# Patient Record
Sex: FEMALE | Race: WHITE | Employment: FULL TIME | URBAN - METROPOLITAN AREA
[De-identification: names, ages, dates, MRNs, and addresses within clinical notes are randomized per-mention and may not be internally consistent; named-entity substitution may affect disease eponyms.]

---

## 2018-11-30 ENCOUNTER — OFFICE VISIT (OUTPATIENT)
Dept: FAMILY MEDICINE CLINIC | Age: 38
End: 2018-11-30
Payer: MEDICAID

## 2018-11-30 VITALS
TEMPERATURE: 97.9 F | HEART RATE: 74 BPM | WEIGHT: 164 LBS | HEIGHT: 63 IN | RESPIRATION RATE: 16 BRPM | SYSTOLIC BLOOD PRESSURE: 120 MMHG | OXYGEN SATURATION: 97 % | DIASTOLIC BLOOD PRESSURE: 60 MMHG | BODY MASS INDEX: 29.06 KG/M2

## 2018-11-30 DIAGNOSIS — J02.9 SORE THROAT: ICD-10-CM

## 2018-11-30 DIAGNOSIS — H60.392 OTHER INFECTIVE ACUTE OTITIS EXTERNA OF LEFT EAR: Primary | ICD-10-CM

## 2018-11-30 LAB — S PYO AG THROAT QL: NORMAL

## 2018-11-30 PROCEDURE — 99213 OFFICE O/P EST LOW 20 MIN: CPT | Performed by: PHYSICIAN ASSISTANT

## 2018-11-30 PROCEDURE — G8484 FLU IMMUNIZE NO ADMIN: HCPCS | Performed by: PHYSICIAN ASSISTANT

## 2018-11-30 PROCEDURE — 87880 STREP A ASSAY W/OPTIC: CPT | Performed by: PHYSICIAN ASSISTANT

## 2018-11-30 PROCEDURE — G8419 CALC BMI OUT NRM PARAM NOF/U: HCPCS | Performed by: PHYSICIAN ASSISTANT

## 2018-11-30 PROCEDURE — 4004F PT TOBACCO SCREEN RCVD TLK: CPT | Performed by: PHYSICIAN ASSISTANT

## 2018-11-30 PROCEDURE — G8427 DOCREV CUR MEDS BY ELIG CLIN: HCPCS | Performed by: PHYSICIAN ASSISTANT

## 2018-11-30 PROCEDURE — 4130F TOPICAL PREP RX AOE: CPT | Performed by: PHYSICIAN ASSISTANT

## 2021-04-09 ENCOUNTER — APPOINTMENT (OUTPATIENT)
Dept: ULTRASOUND IMAGING | Age: 41
End: 2021-04-09
Payer: MEDICAID

## 2021-04-09 ENCOUNTER — APPOINTMENT (OUTPATIENT)
Dept: CT IMAGING | Age: 41
End: 2021-04-09
Payer: MEDICAID

## 2021-04-09 ENCOUNTER — HOSPITAL ENCOUNTER (EMERGENCY)
Age: 41
Discharge: HOME | End: 2021-04-09
Attending: EMERGENCY MEDICINE
Payer: MEDICAID

## 2021-04-09 VITALS
BODY MASS INDEX: 27.29 KG/M2 | RESPIRATION RATE: 16 BRPM | SYSTOLIC BLOOD PRESSURE: 114 MMHG | OXYGEN SATURATION: 98 % | TEMPERATURE: 97.7 F | WEIGHT: 154 LBS | HEART RATE: 81 BPM | DIASTOLIC BLOOD PRESSURE: 75 MMHG | HEIGHT: 63 IN

## 2021-04-09 DIAGNOSIS — R10.30 LOWER ABDOMINAL PAIN: Primary | ICD-10-CM

## 2021-04-09 LAB
ALBUMIN SERPL-MCNC: 4.4 G/DL (ref 3.5–5.2)
ALBUMIN SERPL-MCNC: 4.4 G/DL (ref 3.5–5.2)
ALP BLD-CCNC: 65 U/L (ref 35–104)
ALP BLD-CCNC: 66 U/L (ref 35–104)
ALT SERPL-CCNC: 13 U/L (ref 0–32)
ALT SERPL-CCNC: 13 U/L (ref 0–32)
ANION GAP SERPL CALCULATED.3IONS-SCNC: 12 MMOL/L (ref 7–16)
AST SERPL-CCNC: 15 U/L (ref 0–31)
AST SERPL-CCNC: 16 U/L (ref 0–31)
BACTERIA: ABNORMAL /HPF
BILIRUB SERPL-MCNC: 0.6 MG/DL (ref 0–1.2)
BILIRUB SERPL-MCNC: 0.6 MG/DL (ref 0–1.2)
BILIRUBIN DIRECT: <0.2 MG/DL (ref 0–0.3)
BILIRUBIN URINE: NEGATIVE
BILIRUBIN, INDIRECT: NORMAL MG/DL (ref 0–1)
BLOOD, URINE: NEGATIVE
BUN BLDV-MCNC: 14 MG/DL (ref 6–20)
CALCIUM SERPL-MCNC: 8.9 MG/DL (ref 8.6–10.2)
CHLORIDE BLD-SCNC: 104 MMOL/L (ref 98–107)
CLARITY: CLEAR
CO2: 23 MMOL/L (ref 22–29)
COLOR: YELLOW
CREAT SERPL-MCNC: 0.7 MG/DL (ref 0.5–1)
GFR AFRICAN AMERICAN: >60
GFR NON-AFRICAN AMERICAN: >60 ML/MIN/1.73
GLUCOSE BLD-MCNC: 83 MG/DL (ref 74–99)
GLUCOSE URINE: NEGATIVE MG/DL
HCG, URINE, POC: NEGATIVE
HCT VFR BLD CALC: 45.2 % (ref 34–48)
HEMOGLOBIN: 14.7 G/DL (ref 11.5–15.5)
KETONES, URINE: 15 MG/DL
LACTIC ACID, SEPSIS: 1.2 MMOL/L (ref 0.5–1.9)
LEUKOCYTE ESTERASE, URINE: NEGATIVE
LIPASE: 31 U/L (ref 13–60)
Lab: NORMAL
MCH RBC QN AUTO: 31.5 PG (ref 26–35)
MCHC RBC AUTO-ENTMCNC: 32.5 % (ref 32–34.5)
MCV RBC AUTO: 96.8 FL (ref 80–99.9)
NEGATIVE QC PASS/FAIL: NORMAL
NITRITE, URINE: NEGATIVE
PDW BLD-RTO: 13.8 FL (ref 11.5–15)
PH UA: 5.5 (ref 5–9)
PLATELET # BLD: 299 E9/L (ref 130–450)
PMV BLD AUTO: 10.7 FL (ref 7–12)
POSITIVE QC PASS/FAIL: NORMAL
POTASSIUM SERPL-SCNC: 4.3 MMOL/L (ref 3.5–5)
PROTEIN UA: NEGATIVE MG/DL
RBC # BLD: 4.67 E12/L (ref 3.5–5.5)
RBC UA: ABNORMAL /HPF (ref 0–2)
SODIUM BLD-SCNC: 139 MMOL/L (ref 132–146)
SPECIFIC GRAVITY UA: >=1.03 (ref 1–1.03)
TOTAL PROTEIN: 7.4 G/DL (ref 6.4–8.3)
TOTAL PROTEIN: 7.6 G/DL (ref 6.4–8.3)
UROBILINOGEN, URINE: 0.2 E.U./DL
WBC # BLD: 11 E9/L (ref 4.5–11.5)
WBC UA: ABNORMAL /HPF (ref 0–5)

## 2021-04-09 PROCEDURE — 83605 ASSAY OF LACTIC ACID: CPT

## 2021-04-09 PROCEDURE — 96375 TX/PRO/DX INJ NEW DRUG ADDON: CPT

## 2021-04-09 PROCEDURE — 36415 COLL VENOUS BLD VENIPUNCTURE: CPT

## 2021-04-09 PROCEDURE — 76856 US EXAM PELVIC COMPLETE: CPT

## 2021-04-09 PROCEDURE — 85027 COMPLETE CBC AUTOMATED: CPT

## 2021-04-09 PROCEDURE — 74177 CT ABD & PELVIS W/CONTRAST: CPT

## 2021-04-09 PROCEDURE — 83690 ASSAY OF LIPASE: CPT

## 2021-04-09 PROCEDURE — 96376 TX/PRO/DX INJ SAME DRUG ADON: CPT

## 2021-04-09 PROCEDURE — 99284 EMERGENCY DEPT VISIT MOD MDM: CPT

## 2021-04-09 PROCEDURE — 96374 THER/PROPH/DIAG INJ IV PUSH: CPT

## 2021-04-09 PROCEDURE — 6360000004 HC RX CONTRAST MEDICATION: Performed by: RADIOLOGY

## 2021-04-09 PROCEDURE — 6360000002 HC RX W HCPCS: Performed by: EMERGENCY MEDICINE

## 2021-04-09 PROCEDURE — 2580000003 HC RX 258: Performed by: RADIOLOGY

## 2021-04-09 PROCEDURE — 81001 URINALYSIS AUTO W/SCOPE: CPT

## 2021-04-09 PROCEDURE — 80053 COMPREHEN METABOLIC PANEL: CPT

## 2021-04-09 PROCEDURE — 80076 HEPATIC FUNCTION PANEL: CPT

## 2021-04-09 RX ORDER — ONDANSETRON 4 MG/1
4 TABLET, ORALLY DISINTEGRATING ORAL 3 TIMES DAILY PRN
Qty: 30 TABLET | Refills: 0 | Status: SHIPPED | OUTPATIENT
Start: 2021-04-09 | End: 2021-04-09 | Stop reason: SDUPTHER

## 2021-04-09 RX ORDER — ONDANSETRON 4 MG/1
4 TABLET, ORALLY DISINTEGRATING ORAL 3 TIMES DAILY PRN
Qty: 30 TABLET | Refills: 0 | Status: SHIPPED | OUTPATIENT
Start: 2021-04-09 | End: 2021-09-09

## 2021-04-09 RX ORDER — ONDANSETRON 2 MG/ML
4 INJECTION INTRAMUSCULAR; INTRAVENOUS ONCE
Status: COMPLETED | OUTPATIENT
Start: 2021-04-09 | End: 2021-04-09

## 2021-04-09 RX ORDER — MORPHINE SULFATE 4 MG/ML
4 INJECTION, SOLUTION INTRAMUSCULAR; INTRAVENOUS ONCE
Status: COMPLETED | OUTPATIENT
Start: 2021-04-09 | End: 2021-04-09

## 2021-04-09 RX ORDER — DICYCLOMINE HYDROCHLORIDE 10 MG/1
10 CAPSULE ORAL
Qty: 40 CAPSULE | Refills: 0 | Status: SHIPPED | OUTPATIENT
Start: 2021-04-09 | End: 2021-09-09

## 2021-04-09 RX ORDER — KETOROLAC TROMETHAMINE 30 MG/ML
30 INJECTION, SOLUTION INTRAMUSCULAR; INTRAVENOUS ONCE
Status: COMPLETED | OUTPATIENT
Start: 2021-04-09 | End: 2021-04-09

## 2021-04-09 RX ORDER — SODIUM CHLORIDE 0.9 % (FLUSH) 0.9 %
10 SYRINGE (ML) INJECTION
Status: COMPLETED | OUTPATIENT
Start: 2021-04-09 | End: 2021-04-09

## 2021-04-09 RX ORDER — DICYCLOMINE HYDROCHLORIDE 10 MG/1
10 CAPSULE ORAL
Qty: 40 CAPSULE | Refills: 0 | Status: SHIPPED | OUTPATIENT
Start: 2021-04-09 | End: 2021-04-09 | Stop reason: SDUPTHER

## 2021-04-09 RX ADMIN — MORPHINE SULFATE 4 MG: 4 INJECTION, SOLUTION INTRAMUSCULAR; INTRAVENOUS at 15:32

## 2021-04-09 RX ADMIN — Medication 10 ML: at 13:08

## 2021-04-09 RX ADMIN — KETOROLAC TROMETHAMINE 30 MG: 30 INJECTION, SOLUTION INTRAMUSCULAR; INTRAVENOUS at 12:06

## 2021-04-09 RX ADMIN — IOPAMIDOL 90 ML: 755 INJECTION, SOLUTION INTRAVENOUS at 13:08

## 2021-04-09 RX ADMIN — MORPHINE SULFATE 4 MG: 4 INJECTION, SOLUTION INTRAMUSCULAR; INTRAVENOUS at 12:09

## 2021-04-09 RX ADMIN — ONDANSETRON 4 MG: 2 INJECTION INTRAMUSCULAR; INTRAVENOUS at 12:07

## 2021-04-09 ASSESSMENT — PAIN SCALES - GENERAL: PAINLEVEL_OUTOF10: 9

## 2021-04-09 NOTE — ED NOTES
Radiology Procedure Waiver   Name: Bobby Marin  : 1980  MRN: [id number]    Date:  21    Time: 11:33 AM EDT    Benefits of immediately proceeding with Radiology exam(s) without pre-testing outweigh the risks or are not indicated as specified below and therefore the following is/are being waived:    [x] Pregnancy test   [x] Patients LMP on-time and regular.   [] Patient had Tubal Ligation or has other Contraception Device. [] Patient  is Menopausal or Premenarcheal.    [] Patient had Full or Partial Hysterectomy. [] Protocol for Iodine allergy    [] MRI Questionnaire     [x] BUN/Creatinine   [x] Patient age w/no hx of renal dysfunction. [] Patient on Dialysis. [] Recent Normal Labs.   Electronically signed by Amy Shane DO on 21 at 11:33 AM EDT          Patient had urine pregnancy today at PCP office which was negative     Amy Shane DO  [phone]

## 2021-04-09 NOTE — ED PROVIDER NOTES
allergies. ---------------------------------------------------PHYSICAL EXAM--------------------------------------    Constitutional/General: Alert and oriented x3  Head: Normocephalic and atraumatic  Eyes: PERRL, EOMI, sclera non icteric  Mouth: Oropharynx clear, handling secretions, no trismus, no asymmetry of the posterior oropharynx or uvular edema  Neck: Supple, full ROM, no stridor, no meningeal signs  Respiratory: Lungs clear to auscultation bilaterally, no wheezes, rales, or rhonchi. Not in respiratory distress  Cardiovascular:  Regular rate. Regular rhythm. No murmurs, no aortic murmurs, no gallops, or rubs. 2+ distal pulses. Equal extremity pulses. Chest: No chest wall tenderness  GI:  Abdomen Soft, diffusely tender, Non distended. No rebound, guarding, or rigidity. No pulsatile masses. Musculoskeletal: Moves all extremities x 4. Warm and well perfused, no clubbing, cyanosis, or edema. Capillary refill <3 seconds  Integument: skin warm and dry. No rashes. Neurologic: GCS 15, no focal deficits, symmetric strength 5/5 in the upper and lower extremities bilaterally  Psychiatric: Normal Affect          -------------------------------------------------- RESULTS -------------------------------------------------  I have personally reviewed all laboratory and imaging results for this patient. Results are listed below.      LABS: (Lab results interpreted by me)  Results for orders placed or performed during the hospital encounter of 04/09/21   CBC   Result Value Ref Range    WBC 11.0 4.5 - 11.5 E9/L    RBC 4.67 3.50 - 5.50 E12/L    Hemoglobin 14.7 11.5 - 15.5 g/dL    Hematocrit 45.2 34.0 - 48.0 %    MCV 96.8 80.0 - 99.9 fL    MCH 31.5 26.0 - 35.0 pg    MCHC 32.5 32.0 - 34.5 %    RDW 13.8 11.5 - 15.0 fL    Platelets 996 421 - 565 E9/L    MPV 10.7 7.0 - 12.0 fL   Comprehensive Metabolic Panel   Result Value Ref Range    Sodium 139 132 - 146 mmol/L    Potassium 4.3 3.5 - 5.0 mmol/L    Chloride 104 98 - 107 mmol/L    CO2 23 22 - 29 mmol/L    Anion Gap 12 7 - 16 mmol/L    Glucose 83 74 - 99 mg/dL    BUN 14 6 - 20 mg/dL    CREATININE 0.7 0.5 - 1.0 mg/dL    GFR Non-African American >60 >=60 mL/min/1.73    GFR African American >60     Calcium 8.9 8.6 - 10.2 mg/dL    Total Protein 7.4 6.4 - 8.3 g/dL    Albumin 4.4 3.5 - 5.2 g/dL    Total Bilirubin 0.6 0.0 - 1.2 mg/dL    Alkaline Phosphatase 65 35 - 104 U/L    ALT 13 0 - 32 U/L    AST 15 0 - 31 U/L   Lactate, Sepsis   Result Value Ref Range    Lactic Acid, Sepsis 1.2 0.5 - 1.9 mmol/L   Lipase   Result Value Ref Range    Lipase 31 13 - 60 U/L   Urinalysis with Microscopic   Result Value Ref Range    Color, UA Yellow Straw/Yellow    Clarity, UA Clear Clear    Glucose, Ur Negative Negative mg/dL    Bilirubin Urine Negative Negative    Ketones, Urine 15 (A) Negative mg/dL    Specific Gravity, UA >=1.030 1.005 - 1.030    Blood, Urine Negative Negative    pH, UA 5.5 5.0 - 9.0    Protein, UA Negative Negative mg/dL    Urobilinogen, Urine 0.2 <2.0 E.U./dL    Nitrite, Urine Negative Negative    Leukocyte Esterase, Urine Negative Negative    WBC, UA NONE 0 - 5 /HPF    RBC, UA NONE 0 - 2 /HPF    Bacteria, UA NONE SEEN None Seen /HPF   Hepatic Function Panel   Result Value Ref Range    Total Protein 7.6 6.4 - 8.3 g/dL    Albumin 4.4 3.5 - 5.2 g/dL    Alkaline Phosphatase 66 35 - 104 U/L    ALT 13 0 - 32 U/L    AST 16 0 - 31 U/L    Total Bilirubin 0.6 0.0 - 1.2 mg/dL    Bilirubin, Direct <0.2 0.0 - 0.3 mg/dL    Bilirubin, Indirect see below 0.0 - 1.0 mg/dL   POC Pregnancy Urine Qual   Result Value Ref Range    HCG, Urine, POC Negative Negative    Lot Number [id number]     Positive QC Pass/Fail Pass     Negative QC Pass/Fail Pass    ,       RADIOLOGY:  Interpreted by Radiologist unless otherwise specified  US PELVIS COMPLETE   Final Result   Unremarkable pelvic ultrasound. Both ovaries not demonstrated. CT ABDOMEN PELVIS W IV CONTRAST Additional Contrast? None   Final Result   1. There is no intra-abdominal abscess, free air or inflammatory process. Specifically, there is no perirectal inflammatory process. 2. Minimal amount of free fluid seen within the right pericolic gutter. This   finding is nonspecific. Given the lack of any gross inflammatory process   this finding may be related to low-grade enteritis. 3. The appendix is seen and there are no findings of appendicitis.                   ------------------------- NURSING NOTES AND VITALS REVIEWED ---------------------------   The nursing notes within the ED encounter and vital signs as below have been reviewed by myself  /75   Pulse 81   Temp 97.7 °F (36.5 °C)   Resp 16   Ht 5' 3\" (1.6 m)   Wt 154 lb (69.9 kg)   LMP 03/26/2021   SpO2 98%   BMI 27.28 kg/m²     Oxygen Saturation Interpretation: Normal    The patients available past medical records and past encounters were reviewed. ------------------------------ ED COURSE/MEDICAL DECISION MAKING----------------------  Medications   morphine sulfate (PF) injection 4 mg (4 mg Intravenous Given 4/9/21 1209)   ketorolac (TORADOL) injection 30 mg (30 mg Intravenous Given 4/9/21 1206)   ondansetron (ZOFRAN) injection 4 mg (4 mg Intravenous Given 4/9/21 1207)   iopamidol (ISOVUE-370) 76 % injection 90 mL (90 mLs Intravenous Given 4/9/21 1308)   sodium chloride flush 0.9 % injection 10 mL (10 mLs Intravenous Given 4/9/21 1308)   morphine sulfate (PF) injection 4 mg (4 mg Intravenous Given 4/9/21 1532)           The cardiac monitor revealed sinus tachycardia with a heart rate in the 110s as interpreted by me. The cardiac monitor was ordered secondary to the patient's chest pain and to monitor for patient for dysrhythmia. CPT [id number]           Medical Decision Making:     I, Dr. Naomi Arredondo, am the primary provider of record    30-year-old female presenting with lower abdominal pain and cramping after bowel movement.   The pain has been persistent and is now more diffuse. She has had no urinary symptoms to suggest UTI or kidney stone. The pain is rather diffuse and bilateral, lower suspicion for pelvic abnormality such as ovarian torsion or cyst.  More concerned with GI pathology such as acute appendicitis, diverticulitis, obstruction. She is diffusely tender on exam, slightly tachycardic, otherwise hemodynamically stable. Metabolic panel is within acceptable limits, no leukocytosis or anemia. LFTs unremarkable, lipase negative. Urinalysis shows no signs of infection. CTA is unremarkable. Patient continued to have pain, pelvic ultrasound was performed which showed no abnormality. Upon reassessment patient is eating fast food without difficulty, will be discharged in stable condition, PCP follow-up. Prescription for Bentyl and Zofran. Re-Evaluations: This patient's ED course included: a personal history and physicial examination, re-evaluation prior to disposition, multiple bedside re-evaluations, IV medications, cardiac monitoring and continuous pulse oximetry    This patient has remained hemodynamically stable, improved and been closely monitored during their ED course. Counseling: The emergency provider has spoken with the patient and discussed todays results, in addition to providing specific details for the plan of care and counseling regarding the diagnosis and prognosis. Questions are answered at this time and they are agreeable with the plan.       --------------------------------- IMPRESSION AND DISPOSITION ---------------------------------    IMPRESSION  1. Lower abdominal pain        DISPOSITION  Disposition: Discharge to home  Patient condition is stable        NOTE: This report was transcribed using voice recognition software.  Every effort was made to ensure accuracy; however, inadvertent computerized transcription errors may be present        Angel Castañeda DO  04/09/21 2003

## 2021-09-09 ENCOUNTER — APPOINTMENT (OUTPATIENT)
Dept: GENERAL RADIOLOGY | Age: 41
End: 2021-09-09
Payer: MEDICAID

## 2021-09-09 ENCOUNTER — HOSPITAL ENCOUNTER (EMERGENCY)
Age: 41
Discharge: HOME | End: 2021-09-09
Payer: MEDICAID

## 2021-09-09 VITALS
BODY MASS INDEX: 28.7 KG/M2 | HEART RATE: 86 BPM | WEIGHT: 162 LBS | RESPIRATION RATE: 14 BRPM | HEIGHT: 63 IN | OXYGEN SATURATION: 96 % | DIASTOLIC BLOOD PRESSURE: 88 MMHG | TEMPERATURE: 96.6 F | SYSTOLIC BLOOD PRESSURE: 136 MMHG

## 2021-09-09 DIAGNOSIS — M54.41 ACUTE RIGHT-SIDED LOW BACK PAIN WITH RIGHT-SIDED SCIATICA: Primary | ICD-10-CM

## 2021-09-09 PROCEDURE — 72100 X-RAY EXAM L-S SPINE 2/3 VWS: CPT

## 2021-09-09 PROCEDURE — 99284 EMERGENCY DEPT VISIT MOD MDM: CPT

## 2021-09-09 PROCEDURE — 6360000002 HC RX W HCPCS: Performed by: NURSE PRACTITIONER

## 2021-09-09 PROCEDURE — 96372 THER/PROPH/DIAG INJ SC/IM: CPT

## 2021-09-09 RX ORDER — NAPROXEN 500 MG/1
500 TABLET ORAL 2 TIMES DAILY WITH MEALS
Qty: 14 TABLET | Refills: 0 | Status: SHIPPED | OUTPATIENT
Start: 2021-09-09 | End: 2021-09-16

## 2021-09-09 RX ORDER — CYCLOBENZAPRINE HCL 10 MG
10 TABLET ORAL 3 TIMES DAILY PRN
Qty: 21 TABLET | Refills: 0 | Status: SHIPPED | OUTPATIENT
Start: 2021-09-09 | End: 2021-09-19

## 2021-09-09 RX ORDER — KETOROLAC TROMETHAMINE 30 MG/ML
30 INJECTION, SOLUTION INTRAMUSCULAR; INTRAVENOUS ONCE
Status: COMPLETED | OUTPATIENT
Start: 2021-09-09 | End: 2021-09-09

## 2021-09-09 RX ADMIN — KETOROLAC TROMETHAMINE 30 MG: 30 INJECTION, SOLUTION INTRAMUSCULAR at 14:02

## 2021-09-09 ASSESSMENT — PAIN DESCRIPTION - PROGRESSION: CLINICAL_PROGRESSION: GRADUALLY WORSENING

## 2021-09-09 ASSESSMENT — PAIN SCALES - GENERAL
PAINLEVEL_OUTOF10: 10
PAINLEVEL_OUTOF10: 10
PAINLEVEL_OUTOF10: 8

## 2021-09-09 ASSESSMENT — PAIN DESCRIPTION - PAIN TYPE: TYPE: ACUTE PAIN

## 2021-09-09 ASSESSMENT — PAIN DESCRIPTION - FREQUENCY: FREQUENCY: CONTINUOUS

## 2021-09-09 ASSESSMENT — PAIN DESCRIPTION - ORIENTATION: ORIENTATION: LOWER;RIGHT

## 2021-09-09 ASSESSMENT — PAIN DESCRIPTION - DESCRIPTORS: DESCRIPTORS: DISCOMFORT

## 2021-09-09 ASSESSMENT — PAIN DESCRIPTION - LOCATION: LOCATION: BACK;LEG

## 2021-09-09 ASSESSMENT — PAIN DESCRIPTION - ONSET: ONSET: ON-GOING

## 2021-09-09 ASSESSMENT — PAIN - FUNCTIONAL ASSESSMENT: PAIN_FUNCTIONAL_ASSESSMENT: PREVENTS OR INTERFERES WITH MANY ACTIVE NOT PASSIVE ACTIVITIES

## 2021-09-09 NOTE — ED PROVIDER NOTES
drugs. Family History: family history is not on file. Allergies: Patient has no known allergies. Physical Exam   Oxygen Saturation Interpretation: Normal.        ED Triage Vitals [09/09/21 1318]   BP Temp Temp Source Pulse Resp SpO2 Height Weight   136/88 96.6 °F (35.9 °C) Temporal 86 14 96 % 5' 3\" (1.6 m) 162 lb (73.5 kg)         Constitutional:  Alert, development consistent with age. HEENT:  NC/NT. Airway patent. Neck:  Normal ROM. Supple. Respiratory:  Clear to auscultation and breath sounds equal.  CV:  Regular rate and rhythm, no resting tachycardia. Strong bilateral pedal pulses. GI:  Abdomen Soft, nontender, good bowel sounds. No firm or pulsatile mass. Back: lower lumbar spine right sided. Tenderness: Moderate paraspinal tenderness which reproduces her pain. There is no midline vertebral tenderness, step-off or crepitus. Swelling: no.              Range of Motion: diminished range with pain. Pain is aggravated with sitting up from lying to sitting on cart. CVA Tenderness: No CVA tenderness. Straight leg raising:  Bilateral negative. Skin: No erythema, ecchymosis or open wounds. There is kinesiology tape to the posterior lower torso. Distal Function:              Motor deficit: none. Sensory deficit: none. Pulse deficit: none. Calf Tenderness:  No Bilateral.               Edema:  none Both lower extremity(s). Deep Tendon Reflexes (DTR's) to bilateral knee's and ankle's are normo-reflexive   Gait:  limp due to affected limb without foot drop or ataxia. Integument:  Normal turgor. Warm, dry, without visible rash. Lymphatics: No lymphangitis or adenopathy noted. Neurological:  Oriented. Motor functions intact.     Lab / Imaging Results   (All laboratory and radiology results have been personally reviewed by myself)  Labs:  No results found for this visit on 09/09/21. Imaging: All Radiology results interpreted by Radiologist unless otherwise noted. XR LUMBAR SPINE (2-3 VIEWS)   Final Result   Mild degenerative spondylosis at L5-S1. ED Course / Medical Decision Making     Medications   ketorolac (TORADOL) injection 30 mg (30 mg IntraMUSCular Given 9/9/21 1402)        Re-examination:  9/9/21       Time: 1430  Patients condition is improving after treatment. Patient is able to turn better in the bed and is able to sit herself up. Discussed x-ray results and outpatient plan of care. Consult(s):   None    Procedure(s):   none    Medical Decision Making:    Imaging was obtained as requested by patient as her chiropractor sent her here for plain films. She did not have any spinal manipulation today by her chiropractor. She has no red flags, focal neurologic deficit or midline vertebral tenderness. X-rays interpreted by radiologist negative for fracture. She had improvement with the Toradol injection and does not have a ride therefore was given nothing further for her discomfort. She was started on naproxen twice daily with meals, cyclobenzaprine which she is aware that sedative effects, continuation of ice and she has a follow-up appointment tomorrow morning at 9 AM with her chiropractor, Dr. Edgar Chavez. She is aware of signs and symptoms indicative of reevaluation in the emergency department and educated on red flags for back pain which she was directed toward [address] [place] [address] ER if able otherwise any nearest ER. She was also given [address] [place] [address] Street access to obtain a primary care provider. Patient departed in stable condition. Plan of Care/Counseling:  GINO Hercules CNP reviewed today's visit with the patient in addition to providing specific details for the plan of care and counseling regarding the diagnosis and prognosis. Questions are answered at this time and are agreeable with the plan. Assessment      1. Acute right-sided low back pain with right-sided sciatica      Plan   Discharged home. Patient condition is stable    New Medications     Discharge Medication List as of 9/9/2021  2:29 PM      START taking these medications    Details   cyclobenzaprine (FLEXERIL) 10 MG tablet Take 1 tablet by mouth 3 times daily as needed for Muscle spasms, Disp-21 tablet, R-0Normal           Electronically signed by GINO Ortiz CNP   DD: 9/9/21  **This report was transcribed using voice recognition software. Every effort was made to ensure accuracy; however, inadvertent computerized transcription errors may be present.   END OF ED PROVIDER NOTE       GINO Ortiz CNP  09/09/21 3109

## 2021-09-09 NOTE — ED NOTES
r x x 2 escribe to [facility]    [phone] referral given for follow up     Mati Prescott RN  09/09/21 3612